# Patient Record
Sex: FEMALE
[De-identification: names, ages, dates, MRNs, and addresses within clinical notes are randomized per-mention and may not be internally consistent; named-entity substitution may affect disease eponyms.]

---

## 2021-10-29 ENCOUNTER — HOSPITAL ENCOUNTER (EMERGENCY)
Dept: HOSPITAL 5 - ED | Age: 51
Discharge: HOME | End: 2021-10-29
Payer: MEDICAID

## 2021-10-29 VITALS — SYSTOLIC BLOOD PRESSURE: 112 MMHG | DIASTOLIC BLOOD PRESSURE: 88 MMHG

## 2021-10-29 DIAGNOSIS — R41.82: ICD-10-CM

## 2021-10-29 DIAGNOSIS — F03.90: ICD-10-CM

## 2021-10-29 DIAGNOSIS — N30.90: Primary | ICD-10-CM

## 2021-10-29 DIAGNOSIS — R45.1: ICD-10-CM

## 2021-10-29 LAB
BACTERIA #/AREA URNS HPF: (no result) /HPF
BILIRUB UR QL STRIP: (no result)
BLOOD UR QL VISUAL: (no result)
BUN SERPL-MCNC: 7 MG/DL (ref 7–17)
BUN/CREAT SERPL: 14 %
CALCIUM SERPL-MCNC: 9.2 MG/DL (ref 8.4–10.2)
HCT VFR BLD CALC: 34 % (ref 30.3–42.9)
HEMOLYSIS INDEX: 4
HGB BLD-MCNC: 11.4 GM/DL (ref 10.1–14.3)
MCHC RBC AUTO-ENTMCNC: 34 % (ref 30–34)
MCV RBC AUTO: 90 FL (ref 79–97)
MUCOUS THREADS #/AREA URNS HPF: (no result) /HPF
PH UR STRIP: 6 [PH] (ref 5–7)
PLATELET # BLD: 362 K/MM3 (ref 140–440)
RBC # BLD AUTO: 3.76 M/MM3 (ref 3.65–5.03)
RBC #/AREA URNS HPF: 17 /HPF (ref 0–6)
UROBILINOGEN UR-MCNC: 4 MG/DL (ref ?–2)
WBC #/AREA URNS HPF: 128 /HPF (ref 0–6)

## 2021-10-29 PROCEDURE — 96365 THER/PROPH/DIAG IV INF INIT: CPT

## 2021-10-29 PROCEDURE — 99284 EMERGENCY DEPT VISIT MOD MDM: CPT

## 2021-10-29 PROCEDURE — 81001 URINALYSIS AUTO W/SCOPE: CPT

## 2021-10-29 PROCEDURE — 80048 BASIC METABOLIC PNL TOTAL CA: CPT

## 2021-10-29 PROCEDURE — 85027 COMPLETE CBC AUTOMATED: CPT

## 2021-10-29 PROCEDURE — 36415 COLL VENOUS BLD VENIPUNCTURE: CPT

## 2021-10-29 NOTE — EMERGENCY DEPARTMENT REPORT
ED Altered Mental Status HPI





- General


Chief Complaint: Altered Mental Status


Stated Complaint: KNEE PAIN


Time Seen by Provider: 10/29/21 16:21


Source: EMS


Mode of arrival: Stretcher


Limitations: Altered Mental Status





- History of Present Illness


Initial Comments: 





Patient presents with altered mental status.  History is obtained from family as

the patient is not cogent.  They noted that she has been agitated.  She has been

pulling at things.  She has been telling people she is pregnant.  They picked 

her up from  because she could not be controlled.  They had checked her 

out of her nursing home at the end of September.  This is the first time that 

they have seen her like this since leaving the nursing home.  There has been no 

change in medication.  There has been no fever.  There is no cough or 

congestion.  She has had an episode like this several years ago and this was 

related to a urinary tract infection.  The family believes that she may have 

another urinary tract infection.  The patient really cannot complain of 

symptoms.  She just keeps telling people that she is pregnant.  When asked 

questions directly, she does not provide consistent answers.





- Related Data


                                  Previous Rx's











 Medication  Instructions  Recorded  Last Taken  Type


 


cephALEXin [Keflex] 500 mg PO Q8HR #20 cap 10/29/21 Unknown Rx


 


haloperidoL [Haldol] 2 mg PO TID #10 tab 10/29/21 Unknown Rx











                                    Allergies











Allergy/AdvReac Type Severity Reaction Status Date / Time


 


No Known Allergies Allergy   Unverified 10/29/21 18:21














ED Review of Systems


ROS: 


Stated complaint: KNEE PAIN


Other details as noted in HPI








ED Past Medical Hx





- Past Medical History


Previous Medical History?: Yes


Additional medical history: History of dementia per patient's daughter





- Surgical History


Past Surgical History?: No





- Medications


Home Medications: 


                                Home Medications











 Medication  Instructions  Recorded  Confirmed  Last Taken  Type


 


cephALEXin [Keflex] 500 mg PO Q8HR #20 cap 10/29/21  Unknown Rx


 


haloperidoL [Haldol] 2 mg PO TID #10 tab 10/29/21  Unknown Rx














ED Physical Exam





- General


Limitations: Altered Mental Status





ED Course





- Reevaluation(s)


Reevaluation #1: 





10/29/21 17:41


IV labs ordered.  Orders reviewed.  Haldol was ordered for sedation.


Reevaluation #2: 





10/29/21 20:42


UA has now resulted.  Antibiotics were ordered and the patient was discharged.





- Lab Data


Result diagrams: 


                                 10/29/21 17:44





                                 10/29/21 17:44


                                   Lab Results











  10/29/21 10/29/21 10/29/21 Range/Units





  17:44 17:44 18:55 


 


WBC  10.7    (4.5-11.0)  K/mm3


 


RBC  3.76    (3.65-5.03)  M/mm3


 


Hgb  11.4    (10.1-14.3)  gm/dl


 


Hct  34.0    (30.3-42.9)  %


 


MCV  90    (79-97)  fl


 


MCH  30    (28-32)  pg


 


MCHC  34    (30-34)  %


 


RDW  13.7    (13.2-15.2)  %


 


Plt Count  362    (140-440)  K/mm3


 


Sodium   145   (137-145)  mmol/L


 


Potassium   3.5 L   (3.6-5.0)  mmol/L


 


Chloride   109.7 H   ()  mmol/L


 


Carbon Dioxide   22   (22-30)  mmol/L


 


Anion Gap   17   mmol/L


 


BUN   7   (7-17)  mg/dL


 


Creatinine   0.5 L   (0.6-1.2)  mg/dL


 


Estimated GFR   > 60   ml/min


 


BUN/Creatinine Ratio   14   %


 


Glucose   91   ()  mg/dL


 


Calcium   9.2   (8.4-10.2)  mg/dL


 


Urine Color    Nella  (Yellow)  


 


Urine Turbidity    Cloudy  (Clear)  


 


Urine pH    6.0  (5.0-7.0)  


 


Ur Specific Gravity    1.027  (1.003-1.030)  


 


Urine Protein    100 mg/dl  (Negative)  mg/dL


 


Urine Glucose (UA)    Neg  (Negative)  mg/dL


 


Urine Ketones    Neg  (Negative)  mg/dL


 


Urine Blood    Neg  (Negative)  


 


Urine Nitrite    Neg  (Negative)  


 


Urine Bilirubin    Neg  (Negative)  


 


Urine Urobilinogen    4.0  (<2.0)  mg/dL


 


Ur Leukocyte Esterase    Lg  (Negative)  


 


Urine WBC (Auto)    128.0 H  (0.0-6.0)  /HPF


 


Urine RBC (Auto)    17.0  (0.0-6.0)  /HPF


 


U Epithel Cells (Auto)    13.0  (0-13.0)  /HPF


 


Urine Bacteria (Auto)    3+  (Negative)  /HPF


 


Urine Mucus    1+  /HPF


 


Urine Yeast (Budding)    2+  /HPF














- Medical Decision Making





Patient presents with restlessness and agitation.  This is similar to a prior 

presentation with urinary tract infection.  Patient does have evidence of a 

urinary tract infection here.  She does not have any vital sign instability 

suggestive of severe sepsis or septic shock.  She does not have profound le

ukocytosis.  She does not have any significant fever.  She does not appear to 

have meningitis.  Clinically, there is no dyspnea or cough suggestive of 

pneumonia.  Family is very comfortable taking her home.  They are comfortable 

managing this as they managed it previously.  She was started empirically on 

cephalosporins.  They can follow-up with her primary care physician for ongoing 

management.


Critical Care Time: No


Critical care attestation.: 


If time is entered above; I have spent that time in minutes in the direct care 

of this critically ill patient, excluding procedure time.








ED Disposition


Clinical Impression: 


 Cystitis, Agitation





Disposition: 01 HOME / SELF CARE / HOMELESS


Is pt being admited?: No


Condition: Stable


Additional Instructions: 


Drink plenty water.  Return for problems.  Follow-up with your regular doctor 

for recheck.  Take all of the antibiotics.


Prescriptions: 


haloperidoL [Haldol] 2 mg PO TID #10 tab


cephALEXin [Keflex] 500 mg PO Q8HR #20 cap


Referrals: 


PRIMARY CARE,MD [Referring] - 3-5 Days


NICK MARION MD [Staff Physician] - 3-5 Days

## 2023-03-21 ENCOUNTER — P2P PATIENT RECORD (OUTPATIENT)
Age: 53
End: 2023-03-21

## 2023-04-19 ENCOUNTER — LAB OUTSIDE AN ENCOUNTER (OUTPATIENT)
Dept: URBAN - METROPOLITAN AREA CLINIC 52 | Facility: CLINIC | Age: 53
End: 2023-04-19

## 2023-04-19 ENCOUNTER — OFFICE VISIT (OUTPATIENT)
Dept: URBAN - METROPOLITAN AREA CLINIC 52 | Facility: CLINIC | Age: 53
End: 2023-04-19
Payer: COMMERCIAL

## 2023-04-19 ENCOUNTER — WEB ENCOUNTER (OUTPATIENT)
Dept: URBAN - METROPOLITAN AREA CLINIC 52 | Facility: CLINIC | Age: 53
End: 2023-04-19

## 2023-04-19 VITALS
OXYGEN SATURATION: 93 % | TEMPERATURE: 98.1 F | BODY MASS INDEX: 23.49 KG/M2 | HEART RATE: 93 BPM | SYSTOLIC BLOOD PRESSURE: 105 MMHG | HEIGHT: 64 IN | DIASTOLIC BLOOD PRESSURE: 68 MMHG | WEIGHT: 137.6 LBS

## 2023-04-19 DIAGNOSIS — Z12.11 COLON CANCER SCREENING: ICD-10-CM

## 2023-04-19 PROCEDURE — 99202 OFFICE O/P NEW SF 15 MIN: CPT | Performed by: PHYSICIAN ASSISTANT

## 2023-04-19 RX ORDER — BUDESONIDE AND FORMOTEROL FUMARATE DIHYDRATE 160; 4.5 UG/1; UG/1
2 PUFFS AEROSOL RESPIRATORY (INHALATION) TWICE A DAY
Status: ACTIVE | COMMUNITY

## 2023-04-19 RX ORDER — ASPIRIN 81 MG/1
1 TABLET TABLET, COATED ORAL ONCE A DAY
Status: ACTIVE | COMMUNITY

## 2023-04-19 RX ORDER — ALBUTEROL SULFATE 90 UG/1
1 PUFF AS NEEDED AEROSOL, METERED RESPIRATORY (INHALATION)
Status: ACTIVE | COMMUNITY

## 2023-04-19 RX ORDER — SERTRALINE HYDROCHLORIDE 50 MG/1
1 TABLET TABLET, FILM COATED ORAL ONCE A DAY
Status: ON HOLD | COMMUNITY

## 2023-04-19 RX ORDER — TRAZODONE HYDROCHLORIDE 50 MG/1
1 TABLET AT BEDTIME AS NEEDED TABLET ORAL ONCE A DAY
Status: ON HOLD | COMMUNITY

## 2023-04-19 NOTE — HPI-TODAY'S VISIT:
This is a 52 y.o. female with h/o HTN and COPD who presents to office for colon cancer screening. She denies family h/o colon cancer. Denies GI symptoms.

## 2023-05-02 ENCOUNTER — TELEPHONE ENCOUNTER (OUTPATIENT)
Dept: URBAN - METROPOLITAN AREA CLINIC 52 | Facility: CLINIC | Age: 53
End: 2023-05-02

## 2023-05-04 ENCOUNTER — OFFICE VISIT (OUTPATIENT)
Dept: URBAN - METROPOLITAN AREA SURGERY CENTER 17 | Facility: SURGERY CENTER | Age: 53
End: 2023-05-04
Payer: COMMERCIAL

## 2023-05-04 DIAGNOSIS — Z12.11 COLON CANCER SCREENING: ICD-10-CM

## 2023-05-04 PROCEDURE — G8907 PT DOC NO EVENTS ON DISCHARG: HCPCS | Performed by: INTERNAL MEDICINE

## 2023-05-04 PROCEDURE — 45378 DIAGNOSTIC COLONOSCOPY: CPT | Performed by: INTERNAL MEDICINE

## 2023-05-04 RX ORDER — BUDESONIDE AND FORMOTEROL FUMARATE DIHYDRATE 160; 4.5 UG/1; UG/1
2 PUFFS AEROSOL RESPIRATORY (INHALATION) TWICE A DAY
Status: ACTIVE | COMMUNITY

## 2023-05-04 RX ORDER — TRAZODONE HYDROCHLORIDE 50 MG/1
1 TABLET AT BEDTIME AS NEEDED TABLET ORAL ONCE A DAY
Status: ON HOLD | COMMUNITY

## 2023-05-04 RX ORDER — ALBUTEROL SULFATE 90 UG/1
1 PUFF AS NEEDED AEROSOL, METERED RESPIRATORY (INHALATION)
Status: ACTIVE | COMMUNITY

## 2023-05-04 RX ORDER — SERTRALINE HYDROCHLORIDE 50 MG/1
1 TABLET TABLET, FILM COATED ORAL ONCE A DAY
Status: ON HOLD | COMMUNITY

## 2023-05-04 RX ORDER — ASPIRIN 81 MG/1
1 TABLET TABLET, COATED ORAL ONCE A DAY
Status: ACTIVE | COMMUNITY

## 2023-07-20 ENCOUNTER — CLAIMS CREATED FROM THE CLAIM WINDOW (OUTPATIENT)
Dept: URBAN - METROPOLITAN AREA CLINIC 52 | Facility: CLINIC | Age: 53
End: 2023-07-20
Payer: COMMERCIAL

## 2023-07-20 ENCOUNTER — LAB OUTSIDE AN ENCOUNTER (OUTPATIENT)
Dept: URBAN - METROPOLITAN AREA CLINIC 52 | Facility: CLINIC | Age: 53
End: 2023-07-20

## 2023-07-20 VITALS
WEIGHT: 127.6 LBS | OXYGEN SATURATION: 91 % | HEART RATE: 83 BPM | SYSTOLIC BLOOD PRESSURE: 101 MMHG | DIASTOLIC BLOOD PRESSURE: 67 MMHG | TEMPERATURE: 98.2 F | BODY MASS INDEX: 21.78 KG/M2 | HEIGHT: 64 IN

## 2023-07-20 DIAGNOSIS — N30.00 ACUTE CYSTITIS WITHOUT HEMATURIA: ICD-10-CM

## 2023-07-20 DIAGNOSIS — R76.8 HEPATITIS C ANTIBODY POSITIVE IN BLOOD: ICD-10-CM

## 2023-07-20 PROCEDURE — 99204 OFFICE O/P NEW MOD 45 MIN: CPT | Performed by: PHYSICIAN ASSISTANT

## 2023-07-20 RX ORDER — SERTRALINE HYDROCHLORIDE 50 MG/1
1 TABLET TABLET, FILM COATED ORAL ONCE A DAY
Status: ON HOLD | COMMUNITY

## 2023-07-20 RX ORDER — ASPIRIN 81 MG/1
1 TABLET TABLET, COATED ORAL ONCE A DAY
Status: ON HOLD | COMMUNITY

## 2023-07-20 RX ORDER — BUDESONIDE AND FORMOTEROL FUMARATE DIHYDRATE 160; 4.5 UG/1; UG/1
2 PUFFS AEROSOL RESPIRATORY (INHALATION) TWICE A DAY
Status: ACTIVE | COMMUNITY

## 2023-07-20 RX ORDER — TRAZODONE HYDROCHLORIDE 50 MG/1
1 TABLET AT BEDTIME AS NEEDED TABLET ORAL ONCE A DAY
Status: ON HOLD | COMMUNITY

## 2023-07-20 RX ORDER — ALBUTEROL SULFATE 90 UG/1
1 PUFF AS NEEDED AEROSOL, METERED RESPIRATORY (INHALATION)
Status: ACTIVE | COMMUNITY

## 2023-07-20 NOTE — HPI-TODAY'S VISIT:
53 y.o. female with h/o neurosyphillis, dementia, and HTN presents to office with new diagnosis of Hep C. Recent labs 5/12/23 was positive for Hep C Ab. LFTs normal.  Hep RNA Quant 6/5/23 was 1,040,000. Patient is unsure how she contracted Hep C. Denies h/o IVDA, transfusions, needle sticks.  Family and caregiver present during exam.  Currently, she has not GI complaints.  Colonoscopy 5/4/23 (Dr. Barrera) with [facility], otherwise normal.

## 2023-07-23 LAB
COLOR: (no result)
HCV RNA, QUANTITATIVE: (no result)
HCV RNA, QUANTITATIVE: 6.04

## 2023-07-26 LAB
ALBUMIN/GLOBULIN RATIO: 1.4
ALBUMIN: 4.2
ALKALINE PHOSPHATASE: 56
ALT (SGPT): 27
AST (SGOT): 27
BILIRUBIN, DIRECT: 0.1
BILIRUBIN, INDIRECT: 0.4
BILIRUBIN, TOTAL: 0.5
COMMENT: (no result)
GLOBULIN: 3.1
HBSAG SCREEN: (no result)
HCV RNA, QUANTITATIVE REAL TIME PCR: (no result)
HCV RNA, QUANTITATIVE REAL TIME PCR: 5.52
HEP A AB, IGM: (no result)
HEP B CORE AB, IGM: (no result)
HEPATITIS C ANTIBODY: REACTIVE
HEPATITIS C VIRAL RNA: (no result)
PROTEIN, TOTAL: 7.3

## 2023-08-08 ENCOUNTER — TELEPHONE ENCOUNTER (OUTPATIENT)
Dept: URBAN - METROPOLITAN AREA CLINIC 52 | Facility: CLINIC | Age: 53
End: 2023-08-08

## 2023-08-28 ENCOUNTER — TELEPHONE ENCOUNTER (OUTPATIENT)
Dept: URBAN - METROPOLITAN AREA CLINIC 52 | Facility: CLINIC | Age: 53
End: 2023-08-28

## 2023-08-30 ENCOUNTER — TELEPHONE ENCOUNTER (OUTPATIENT)
Dept: URBAN - METROPOLITAN AREA CLINIC 52 | Facility: CLINIC | Age: 53
End: 2023-08-30

## 2023-09-06 ENCOUNTER — TELEPHONE ENCOUNTER (OUTPATIENT)
Dept: URBAN - METROPOLITAN AREA CLINIC 52 | Facility: CLINIC | Age: 53
End: 2023-09-06

## 2023-09-11 ENCOUNTER — OFFICE VISIT (OUTPATIENT)
Dept: URBAN - METROPOLITAN AREA CLINIC 52 | Facility: CLINIC | Age: 53
End: 2023-09-11
Payer: COMMERCIAL

## 2023-09-11 ENCOUNTER — TELEPHONE ENCOUNTER (OUTPATIENT)
Dept: URBAN - METROPOLITAN AREA CLINIC 94 | Facility: CLINIC | Age: 53
End: 2023-09-11

## 2023-09-11 VITALS
BODY MASS INDEX: 21.17 KG/M2 | WEIGHT: 124 LBS | SYSTOLIC BLOOD PRESSURE: 88 MMHG | DIASTOLIC BLOOD PRESSURE: 62 MMHG | TEMPERATURE: 97.7 F | HEART RATE: 88 BPM | HEIGHT: 64 IN | OXYGEN SATURATION: 91 %

## 2023-09-11 DIAGNOSIS — B18.2 CHRONIC HEPATITIS C WITHOUT HEPATIC COMA: ICD-10-CM

## 2023-09-11 PROBLEM — 128302006: Status: ACTIVE | Noted: 2023-09-11

## 2023-09-11 PROCEDURE — 99213 OFFICE O/P EST LOW 20 MIN: CPT | Performed by: PHYSICIAN ASSISTANT

## 2023-09-11 RX ORDER — VELPATASVIR AND SOFOSBUVIR 100; 400 MG/1; MG/1
1 TABLET TABLET, FILM COATED ORAL ONCE A DAY
Qty: 90 TABLET | Refills: 0 | OUTPATIENT
Start: 2023-09-11 | End: 2023-12-09

## 2023-09-11 RX ORDER — BUDESONIDE AND FORMOTEROL FUMARATE DIHYDRATE 160; 4.5 UG/1; UG/1
2 PUFFS AEROSOL RESPIRATORY (INHALATION) TWICE A DAY
Status: ACTIVE | COMMUNITY

## 2023-09-11 RX ORDER — ALBUTEROL SULFATE 90 UG/1
1 PUFF AS NEEDED AEROSOL, METERED RESPIRATORY (INHALATION)
Status: ACTIVE | COMMUNITY

## 2023-09-11 NOTE — HPI-TODAY'S VISIT:
53 y.o. female with h/o neurosyphillis, dementia, and HTN presents to office following up with Hep C. Patient is unsure how she contracted Hep C. Denies h/o IVDA, transfusions, needle sticks. Caregiver is present during exam. Currently, she has no GI complaints.   Labs 5/12/23 was positive for Hep C Ab. LFTs normal.  Hep RNA Quant 6/5/23 was 1,040,000. Labs 7/20/23 Hep C  RNA 1,109,000.  Genotype 1a.  Normal hepatic panel. Hep A, Hep B negative. Liver ultrasound 8/14/23 fatty liver, otherwise unremarkable.   Colonoscopy 5/4/23 (Dr. Barrera) with [facility], otherwise normal.

## 2023-09-15 ENCOUNTER — TELEPHONE ENCOUNTER (OUTPATIENT)
Dept: URBAN - METROPOLITAN AREA CLINIC 52 | Facility: CLINIC | Age: 53
End: 2023-09-15

## 2023-10-10 ENCOUNTER — OFFICE VISIT (OUTPATIENT)
Dept: URBAN - METROPOLITAN AREA CLINIC 94 | Facility: CLINIC | Age: 53
End: 2023-10-10
Payer: COMMERCIAL

## 2023-10-10 VITALS
HEIGHT: 64 IN | HEART RATE: 78 BPM | SYSTOLIC BLOOD PRESSURE: 99 MMHG | WEIGHT: 126 LBS | TEMPERATURE: 97.9 F | DIASTOLIC BLOOD PRESSURE: 66 MMHG | BODY MASS INDEX: 21.51 KG/M2 | OXYGEN SATURATION: 94 %

## 2023-10-10 DIAGNOSIS — B18.2 CHRONIC HEPATITIS C WITHOUT HEPATIC COMA: ICD-10-CM

## 2023-10-10 PROCEDURE — 99213 OFFICE O/P EST LOW 20 MIN: CPT | Performed by: PHYSICIAN ASSISTANT

## 2023-10-10 RX ORDER — ALBUTEROL SULFATE 90 UG/1
1 PUFF AS NEEDED AEROSOL, METERED RESPIRATORY (INHALATION)
Status: ACTIVE | COMMUNITY

## 2023-10-10 RX ORDER — VELPATASVIR AND SOFOSBUVIR 100; 400 MG/1; MG/1
1 TABLET TABLET, FILM COATED ORAL ONCE A DAY
Qty: 90 TABLET | Refills: 0 | Status: ACTIVE | COMMUNITY
Start: 2023-09-11 | End: 2023-12-09

## 2023-10-10 RX ORDER — BUDESONIDE AND FORMOTEROL FUMARATE DIHYDRATE 160; 4.5 UG/1; UG/1
2 PUFFS AEROSOL RESPIRATORY (INHALATION) TWICE A DAY
Status: ACTIVE | COMMUNITY

## 2023-10-10 NOTE — HPI-TODAY'S VISIT:
53 y.o. female with h/o neurosyphillis, dementia, and HTN presents to office following up with Hep C. Patient is unsure how she contracted Hep C. Denies h/o IVDA, transfusions, needle sticks. Caregiver is present during exam. Currently, she has no GI complaints.   She started Epclusa 9/16/23 and is tolerating the medication well.   Labs 5/12/23 was positive for Hep C Ab. LFTs normal.  Hep RNA Quant 6/5/23 was 1,040,000. Labs 7/20/23 Hep C  RNA 1,109,000.  Genotype 1a.  Normal hepatic panel. Hep A, Hep B negative. Liver ultrasound 8/14/23 fatty liver, otherwise unremarkable.   Colonoscopy 5/4/23 (Dr. Barrera) with [facility], otherwise normal.

## 2023-10-12 LAB
ALBUMIN/GLOBULIN RATIO: 1.5
ALBUMIN: 4.2
ALKALINE PHOSPHATASE: 51
ALT (SGPT): 11
AST (SGOT): 15
BILIRUBIN, DIRECT: 0.1
BILIRUBIN, INDIRECT: 0.5
BILIRUBIN, TOTAL: 0.6
GLOBULIN: 2.8
HCV RNA, QUANTITATIVE: 1.24
HCV RNA, QUANTITATIVE: 17
HEMATOCRIT: 38.2
HEMOGLOBIN: 12.7
MCH: 29.7
MCHC: 33.2
MCV: 89.3
MPV: 9.1
PLATELET COUNT: 367
PROTEIN, TOTAL: 7
RDW: 13.3
RED BLOOD CELL COUNT: 4.28
WHITE BLOOD CELL COUNT: 5

## 2023-12-04 ENCOUNTER — OFFICE VISIT (OUTPATIENT)
Dept: URBAN - METROPOLITAN AREA CLINIC 52 | Facility: CLINIC | Age: 53
End: 2023-12-04
Payer: COMMERCIAL

## 2023-12-04 VITALS
WEIGHT: 122 LBS | BODY MASS INDEX: 20.83 KG/M2 | TEMPERATURE: 97.9 F | DIASTOLIC BLOOD PRESSURE: 68 MMHG | HEIGHT: 64 IN | OXYGEN SATURATION: 90 % | HEART RATE: 89 BPM | SYSTOLIC BLOOD PRESSURE: 101 MMHG

## 2023-12-04 DIAGNOSIS — B18.2 CHRONIC HEPATITIS C WITHOUT HEPATIC COMA: ICD-10-CM

## 2023-12-04 PROCEDURE — 99213 OFFICE O/P EST LOW 20 MIN: CPT | Performed by: PHYSICIAN ASSISTANT

## 2023-12-04 RX ORDER — ALBUTEROL SULFATE 90 UG/1
1 PUFF AS NEEDED AEROSOL, METERED RESPIRATORY (INHALATION)
Status: ACTIVE | COMMUNITY

## 2023-12-04 RX ORDER — BUDESONIDE AND FORMOTEROL FUMARATE DIHYDRATE 160; 4.5 UG/1; UG/1
2 PUFFS AEROSOL RESPIRATORY (INHALATION) TWICE A DAY
Status: ACTIVE | COMMUNITY

## 2023-12-04 RX ORDER — VELPATASVIR AND SOFOSBUVIR 100; 400 MG/1; MG/1
1 TABLET TABLET, FILM COATED ORAL ONCE A DAY
Qty: 90 TABLET | Refills: 0 | Status: ACTIVE | COMMUNITY
Start: 2023-09-11 | End: 2023-12-09

## 2023-12-16 LAB
ALBUMIN/GLOBULIN RATIO: 1.6
ALBUMIN: 4.4
ALKALINE PHOSPHATASE: 63
ALT (SGPT): 8
AST (SGOT): 14
BILIRUBIN, DIRECT: 0.1
BILIRUBIN, INDIRECT: 0.3
BILIRUBIN, TOTAL: 0.4
GLOBULIN: 2.7
HCV RNA, QUANTITATIVE: <1.18
HCV RNA, QUANTITATIVE: <15
PROTEIN, TOTAL: 7.1

## 2024-04-05 ENCOUNTER — OV EP (OUTPATIENT)
Dept: URBAN - METROPOLITAN AREA CLINIC 52 | Facility: CLINIC | Age: 54
End: 2024-04-05

## 2024-04-22 ENCOUNTER — OV EP (OUTPATIENT)
Dept: URBAN - METROPOLITAN AREA CLINIC 52 | Facility: CLINIC | Age: 54
End: 2024-04-22

## 2024-04-22 RX ORDER — BUDESONIDE AND FORMOTEROL FUMARATE DIHYDRATE 160; 4.5 UG/1; UG/1
2 PUFFS AEROSOL RESPIRATORY (INHALATION) TWICE A DAY
COMMUNITY

## 2024-04-22 RX ORDER — ALBUTEROL SULFATE 90 UG/1
1 PUFF AS NEEDED AEROSOL, METERED RESPIRATORY (INHALATION)
COMMUNITY

## 2024-06-05 ENCOUNTER — DASHBOARD ENCOUNTERS (OUTPATIENT)
Age: 54
End: 2024-06-05

## 2024-06-05 ENCOUNTER — OFFICE VISIT (OUTPATIENT)
Dept: URBAN - METROPOLITAN AREA CLINIC 52 | Facility: CLINIC | Age: 54
End: 2024-06-05
Payer: COMMERCIAL

## 2024-06-05 VITALS
OXYGEN SATURATION: 90 % | DIASTOLIC BLOOD PRESSURE: 65 MMHG | HEART RATE: 105 BPM | TEMPERATURE: 97.7 F | HEIGHT: 64 IN | BODY MASS INDEX: 21.44 KG/M2 | WEIGHT: 125.6 LBS | SYSTOLIC BLOOD PRESSURE: 96 MMHG

## 2024-06-05 DIAGNOSIS — B18.2 CHRONIC HEPATITIS C WITHOUT HEPATIC COMA: ICD-10-CM

## 2024-06-05 DIAGNOSIS — K58.0 IRRITABLE BOWEL SYNDROME WITH DIARRHEA: ICD-10-CM

## 2024-06-05 PROBLEM — 197125005: Status: ACTIVE | Noted: 2024-06-05

## 2024-06-05 PROCEDURE — 99214 OFFICE O/P EST MOD 30 MIN: CPT | Performed by: PHYSICIAN ASSISTANT

## 2024-06-05 RX ORDER — BUDESONIDE AND FORMOTEROL FUMARATE DIHYDRATE 160; 4.5 UG/1; UG/1
2 PUFFS AEROSOL RESPIRATORY (INHALATION) TWICE A DAY
Status: ACTIVE | COMMUNITY

## 2024-06-05 RX ORDER — ALBUTEROL SULFATE 90 UG/1
1 PUFF AS NEEDED AEROSOL, METERED RESPIRATORY (INHALATION)
Status: ACTIVE | COMMUNITY

## 2024-06-05 RX ORDER — DICYCLOMINE HYDROCHLORIDE 10 MG/1
1 CAPSULE CAPSULE ORAL
Qty: 90 | Refills: 0 | OUTPATIENT
Start: 2024-06-05 | End: 2024-07-05

## 2024-06-05 NOTE — HPI-TODAY'S VISIT:
53 y.o. female with h/o neurosyphillis, dementia, and HTN presents to office following up with Hep C. Patient is unsure how she contracted Hep C. Denies h/o IVDA, transfusions, needle sticks. Cousin is present during exam.  Today, she c/o loose stool and associated abdominal cramping minutes to one hour after meals. She also has loose stools upon waking. She has at least 3 loose stools a day. Not black or bloody. Dairy can make symptoms worse. Otherwise, she has no GI complaints.   She started Epclusa 9/16/23 and completed full course of medication.  Labs 5/12/23 was positive for Hep C Ab. LFTs normal.  Hep RNA Quant 6/5/23 was 1,040,000. Labs 7/20/23 Hep C  RNA 1,109,000.  Genotype 1a.  Normal hepatic panel. Hep A, Hep B negative. Labs 10/10/23 Hep C RNA 17.0.   Normal CBC, Normal hepatic panel.  Liver ultrasound 8/14/23 fatty liver, otherwise unremarkable.   Colonoscopy 5/4/23 (Dr. Barrera) with [facility], otherwise normal.

## 2024-06-14 LAB
A/G RATIO: 1.6
ALBUMIN: 4.2
ALKALINE PHOSPHATASE: 52
ALT (SGPT): 9
AST (SGOT): 12
BILIRUBIN, TOTAL: 0.6
BUN/CREATININE RATIO: (no result)
BUN: 8
CALCIUM: 9.3
CARBON DIOXIDE, TOTAL: 26
CHLORIDE: 108
CREATININE: 0.68
EGFR: 103
GLOBULIN, TOTAL: 2.6
GLUCOSE: 106
HCV RNA, QUANTITATIVE: <1.18
HCV RNA, QUANTITATIVE: <15
POTASSIUM: 4.1
PROTEIN, TOTAL: 6.8
SODIUM: 141

## 2024-07-16 ENCOUNTER — TELEPHONE ENCOUNTER (OUTPATIENT)
Dept: URBAN - METROPOLITAN AREA CLINIC 6 | Facility: CLINIC | Age: 54
End: 2024-07-16

## 2024-07-17 ENCOUNTER — LAB OUTSIDE AN ENCOUNTER (OUTPATIENT)
Dept: URBAN - METROPOLITAN AREA CLINIC 52 | Facility: CLINIC | Age: 54
End: 2024-07-17

## 2024-07-17 ENCOUNTER — OFFICE VISIT (OUTPATIENT)
Dept: URBAN - METROPOLITAN AREA CLINIC 52 | Facility: CLINIC | Age: 54
End: 2024-07-17
Payer: COMMERCIAL

## 2024-07-17 VITALS
DIASTOLIC BLOOD PRESSURE: 67 MMHG | OXYGEN SATURATION: 93 % | BODY MASS INDEX: 22.26 KG/M2 | HEIGHT: 64 IN | SYSTOLIC BLOOD PRESSURE: 95 MMHG | WEIGHT: 130.4 LBS | HEART RATE: 100 BPM | TEMPERATURE: 97.7 F

## 2024-07-17 DIAGNOSIS — R10.84 GENERALIZED ABDOMINAL PAIN: ICD-10-CM

## 2024-07-17 PROCEDURE — 99213 OFFICE O/P EST LOW 20 MIN: CPT | Performed by: PHYSICIAN ASSISTANT

## 2024-07-17 RX ORDER — ALBUTEROL SULFATE 90 UG/1
1 PUFF AS NEEDED AEROSOL, METERED RESPIRATORY (INHALATION)
Status: ACTIVE | COMMUNITY

## 2024-07-17 RX ORDER — BUDESONIDE AND FORMOTEROL FUMARATE DIHYDRATE 160; 4.5 UG/1; UG/1
2 PUFFS AEROSOL RESPIRATORY (INHALATION) TWICE A DAY
Status: ACTIVE | COMMUNITY

## 2024-07-17 NOTE — HPI-TODAY'S VISIT:
54 y.o. female with h/o neurosyphillis, dementia, Hep C and HTN presents to office following up. Patient is unsure how she contracted Hep C. Denies h/o IVDA, transfusions, needle sticks. Cousin is present during exam.  Today, she is f/u with loose stool and associated abdominal cramping minutes to one hour after meals. On her last office visit she reported loose stools upon waking, with at least 3 loose stools a day. Not black or bloody. Dairy can make symptoms worse. On previous office visit, Dicyclomine was given on previous office visit, and patient has seen significant improvement in diarrhea and cramping, though she does continue to have some generalized abdominal cramping. She usually takes it once in the morning.   She started Epclusa 9/16/23 and completed full course of medication.  Labs 5/12/23 was positive for Hep C Ab. LFTs normal.  Hep RNA Quant 6/5/23 was 1,040,000. Labs 7/20/23 Hep C  RNA 1,109,000.  Genotype 1a.  Normal hepatic panel. Hep A, Hep B negative. Labs 10/10/23 Hep C RNA 17.0.   Normal CBC, Normal hepatic panel. Labs 6/5/24: HCV C RNA not detected. Normal LFTs.  Liver ultrasound 8/14/23 fatty liver, otherwise unremarkable.   Colonoscopy 5/4/23 (Dr. Barrera) with [facility], otherwise normal.

## 2024-08-09 NOTE — HPI-TODAY'S VISIT:
53 y.o. female with h/o neurosyphillis, dementia, and HTN presents to office following up with Hep C. Patient is unsure how she contracted Hep C. Denies h/o IVDA, transfusions, needle sticks. Caregiver is present during exam. Currently, she has no GI complaints.   She started Epclusa 9/16/23 and tolerated medication. She has about 2 weeks of medication left.   Labs 5/12/23 was positive for Hep C Ab. LFTs normal.  Hep RNA Quant 6/5/23 was 1,040,000. Labs 7/20/23 Hep C  RNA 1,109,000.  Genotype 1a.  Normal hepatic panel. Hep A, Hep B negative. Labs 10/10/23 Hep C RNA 17.0.   Normal CBC, Normal hepatic panel.  Liver ultrasound 8/14/23 fatty liver, otherwise unremarkable.   Colonoscopy 5/4/23 (Dr. Barrera) with [facility], otherwise normal. Detail Level: Detailed Quality 358: Patient-Centered Surgical Risk Assessment And Communication: Documentation of patient-specific risk assessment with a risk calculator based on multi-institutional clinical data, the specific risk calculator used, and communication of risk assessment from risk calculator with the patient or family.

## 2024-08-27 ENCOUNTER — TELEPHONE ENCOUNTER (OUTPATIENT)
Dept: URBAN - METROPOLITAN AREA CLINIC 52 | Facility: CLINIC | Age: 54
End: 2024-08-27

## 2024-10-23 ENCOUNTER — OFFICE VISIT (OUTPATIENT)
Dept: URBAN - METROPOLITAN AREA CLINIC 52 | Facility: CLINIC | Age: 54
End: 2024-10-23
Payer: COMMERCIAL

## 2024-10-23 VITALS
DIASTOLIC BLOOD PRESSURE: 65 MMHG | OXYGEN SATURATION: 96 % | BODY MASS INDEX: 22.43 KG/M2 | TEMPERATURE: 97.7 F | HEIGHT: 64 IN | HEART RATE: 87 BPM | WEIGHT: 131.4 LBS | SYSTOLIC BLOOD PRESSURE: 101 MMHG

## 2024-10-23 DIAGNOSIS — Z86.19 HISTORY OF HEPATITIS C: ICD-10-CM

## 2024-10-23 DIAGNOSIS — K58.0 IRRITABLE BOWEL SYNDROME WITH DIARRHEA: ICD-10-CM

## 2024-10-23 PROCEDURE — 99213 OFFICE O/P EST LOW 20 MIN: CPT | Performed by: INTERNAL MEDICINE

## 2024-10-23 RX ORDER — BUDESONIDE AND FORMOTEROL FUMARATE DIHYDRATE 160; 4.5 UG/1; UG/1
2 PUFFS AEROSOL RESPIRATORY (INHALATION) TWICE A DAY
Status: ACTIVE | COMMUNITY

## 2024-10-23 RX ORDER — ALBUTEROL SULFATE 90 UG/1
1 PUFF AS NEEDED AEROSOL, METERED RESPIRATORY (INHALATION)
Status: ACTIVE | COMMUNITY

## 2024-10-23 NOTE — HPI-TODAY'S VISIT:
54 y.o. female with h/o neurosyphillis, dementia, Hep C and HTN presents to office following up. Patient is unsure how she contracted Hep C. Denies h/o IVDA, transfusions, needle sticks. Cousin is present during exam.  Today, she is f/u with loose stool and associated abdominal cramping minutes to one hour after meals. On her last office visit she reported loose stools upon waking, with at least 3 loose stools a day. Not black or bloody. Dairy can make symptoms worse. On previous office visit, Dicyclomine was given on previous office visit, and patient has seen significant improvement in diarrhea and cramping, though she does continue to have some generalized abdominal cramping. She usually takes it once in the morning.   She started Epclusa 9/16/23 and completed full course of medication.  Labs 5/12/23 was positive for Hep C Ab. LFTs normal.  Hep RNA Quant 6/5/23 was 1,040,000. Labs 7/20/23 Hep C  RNA 1,109,000.  Genotype 1a.  Normal hepatic panel. Hep A, Hep B negative. Labs 10/10/23 Hep C RNA 17.0.   Normal CBC, Normal hepatic panel. Labs 6/5/24: HCV C RNA not detected. Normal LFTs.  Liver ultrasound 8/14/23 fatty liver, otherwise unremarkable.  Colonoscopy 5/4/23 (Dr. Barrera) with [facility], otherwise normal.  - Liver US 8/2024; Fatty liver.  - Currently , pt denies any GI symptoms.